# Patient Record
Sex: FEMALE | Race: WHITE | URBAN - METROPOLITAN AREA
[De-identification: names, ages, dates, MRNs, and addresses within clinical notes are randomized per-mention and may not be internally consistent; named-entity substitution may affect disease eponyms.]

---

## 2020-07-21 ENCOUNTER — VIRTUAL VISIT (OUTPATIENT)
Dept: GERIATRICS | Facility: CLINIC | Age: 85
End: 2020-07-21
Payer: MEDICARE

## 2020-07-21 DIAGNOSIS — M15.9 OSTEOARTHRITIS OF MULTIPLE JOINTS, UNSPECIFIED OSTEOARTHRITIS TYPE: ICD-10-CM

## 2020-07-21 DIAGNOSIS — U07.1 ASYMPTOMATIC COVID-19 VIRUS INFECTION: Primary | ICD-10-CM

## 2020-07-21 DIAGNOSIS — R41.89 COGNITIVE IMPAIRMENT: ICD-10-CM

## 2020-07-21 DIAGNOSIS — F60.0 PARANOID PERSONALITY (DISORDER) (H): ICD-10-CM

## 2020-07-21 PROCEDURE — 99309 SBSQ NF CARE MODERATE MDM 30: CPT | Mod: 95 | Performed by: NURSE PRACTITIONER

## 2020-07-21 RX ORDER — MENTHOL 1.4 %
ADHESIVE PATCH, MEDICATED TOPICAL 2 TIMES DAILY
COMMUNITY

## 2020-07-21 RX ORDER — CHOLECALCIFEROL (VITAMIN D3) 50 MCG
2000 TABLET ORAL DAILY
COMMUNITY

## 2020-07-21 ASSESSMENT — MIFFLIN-ST. JEOR: SCORE: 1028.23

## 2020-07-21 NOTE — PROGRESS NOTES
" [facility]  Marian Baird is being evaluated via a billable video visit due to the restrictions of the Covid-19 pandemic and facility request that providers do not come into the building at this time.   The patient has been notified of following:  \"This video visit will be conducted via a call between you and your provider. We have found that certain health care needs can be provided without the need for an in-person physical exam.  This service lets us provide the care you need with a video conversation. If during the course of the call the provider feels a video visit is not appropriate, you will not be charged for this service.\"   The provider has received verbal consent for a Video Visit from the patient and or first contact? Yes  Patient/facility staff would like the video invitation sent by: N/A   Video Start Time: 1:39pm  Which Facility the Patient is at during the time of visit: [facility]    PRIMARY CARE PROVIDER AND CLINIC:  Physician No Ref-Primary, No address on file  Chief Complaint   Patient presents with     Hospital F/U     [place] Medical Record Number:  [id number]  Marian Baird  is a 89 year old  (10/22/1928), admitted to the above facility from [facility]. Admitted to this facility for  medical management and nursing care due to testing positive for COVID and facility unable to manage isolation.    HPI:    HPI information obtained from: facility chart records, facility staff and patient report.     Updates on Status Since Skilled nursing Admission:   Patient asks several times about her COVID test. She does not understand why she is positive or why she is at this facility. She denies SOB, cough, fever, chills, nausea, diarrhea, malaise, myalgia. Appetite is fine. Denies any pain. Staff report some paranoid delusions, but she is generally calm. She is comfort care, on minimal medications.     CODE STATUS/ADVANCE DIRECTIVES DISCUSSION:   DNR / DNI  Patient's " "living condition: lives in a skilled nursing facility  ALLERGIES: Patient has no allergy information on record.  PAST MEDICAL HISTORY:  has no past medical history on file.  PAST SURGICAL HISTORY:   has no past surgical history on file.  FAMILY HISTORY: family history is not on file.  SOCIAL HISTORY:     Current Outpatient Medications   Medication Sig Dispense Refill     Cholecalciferol (VITAMIN D3) 50 MCG (2000 UT) TABS Take 2,000 Units by mouth daily        Menthol-Methyl Salicylate (SHERITA MEI GREASELESS) cream Apply topically 2 times daily          ROS: 10 point ROS of systems including Constitutional, Eyes, Respiratory, Cardiovascular, Gastroenterology, Genitourinary, Integumentary, Musculoskeletal, Psychiatric were all negative except for pertinent positives noted in my HPI.  Vitals:/65   Pulse 66   Temp 97.8  F (36.6  C)   Resp 16   Ht 1.702 m (5' 7\")   Wt 58.1 kg (128 lb)   BMI 20.05 kg/m     Limited Visit Exam done given COVID-19 precautions:  GENERAL APPEARANCE:  Alert, in no distress, thin  ENT:  [place]  EYES:  Conjunctiva and lids normal  RESP:  no respiratory distress, no cough  PSYCH:  insight and judgement impaired, memory impaired     Lab/Diagnostic data:  none    ASSESSMENT/PLAN:  (U07.1) Asymptomatic COVID-19 virus infection  (primary encounter diagnosis)  Comment: Patient could still develop symptoms in the next week or so, but it seems unlikely at this point. She cannot return to her nursing home until she has 2 consecutive negative tests. As long as she remains symptomatic, she can be retested 7 days after her positive test, which would be 7/22  Plan: Retest and discharge as able    (F60.0) Paranoid personality (disorder) (H)  Comment: Not on any medications. No emotional distress noted by staff  Plan: Monitor mood and behavior. Use redirection and emotional support. Avoid psychotropic medication use.    (R41.89) Cognitive impairment  Comment: Difficult to assess fully on video with [place], " but certainly has obvious impairment with her repeated questions  Plan: 24 hour care    (M15.9) Osteoarthritis of multiple joints, unspecified osteoarthritis type  Comment: No c/o pain  Plan: Monitor for pain      Electronically signed by:  OPAL Antony CNP   [facility]  Phone: [phone]      Video-Visit Details  Type of service:  Video Visit  Video End Time (time video stopped): 1:42pm  Distant Location (provider location):  [facility]

## 2020-07-21 NOTE — LETTER
"    7/21/2020        RE: Marian Baird  [address] [facility] [address]         [facility]  Marian Baird is being evaluated via a billable video visit due to the restrictions of the Covid-19 pandemic and facility request that providers do not come into the building at this time.   The patient has been notified of following:  \"This video visit will be conducted via a call between you and your provider. We have found that certain health care needs can be provided without the need for an in-person physical exam.  This service lets us provide the care you need with a video conversation. If during the course of the call the provider feels a video visit is not appropriate, you will not be charged for this service.\"   The provider has received verbal consent for a Video Visit from the patient and or first contact? Yes  Patient/facility staff would like the video invitation sent by: N/A   Video Start Time: 1:39pm  Which Facility the Patient is at during the time of visit: [facility]    PRIMARY CARE PROVIDER AND CLINIC:  Physician No Ref-Primary, No address on file  Chief Complaint   Patient presents with     Hospital F/U     [place] Medical Record Number:  [id number]  Marian Baird  is a 89 year old  (10/22/1928), admitted to the above facility from [facility]. Admitted to this facility for  medical management and nursing care due to testing positive for COVID and facility unable to manage isolation.    HPI:    HPI information obtained from: facility chart records, facility staff and patient report.     Updates on Status Since Skilled nursing Admission:   Patient asks several times about her COVID test. She does not understand why she is positive or why she is at this facility. She denies SOB, cough, fever, chills, nausea, diarrhea, malaise, myalgia. Appetite is fine. Denies any pain. Staff report some paranoid delusions, but she is generally calm. She is comfort care, " "on minimal medications.     CODE STATUS/ADVANCE DIRECTIVES DISCUSSION:   DNR / DNI  Patient's living condition: lives in a skilled nursing facility  ALLERGIES: Patient has no allergy information on record.  PAST MEDICAL HISTORY:  has no past medical history on file.  PAST SURGICAL HISTORY:   has no past surgical history on file.  FAMILY HISTORY: family history is not on file.  SOCIAL HISTORY:     Current Outpatient Medications   Medication Sig Dispense Refill     Cholecalciferol (VITAMIN D3) 50 MCG (2000 UT) TABS Take 2,000 Units by mouth daily        Menthol-Methyl Salicylate (SHERITA MEI GREASELESS) cream Apply topically 2 times daily          ROS: 10 point ROS of systems including Constitutional, Eyes, Respiratory, Cardiovascular, Gastroenterology, Genitourinary, Integumentary, Musculoskeletal, Psychiatric were all negative except for pertinent positives noted in my HPI.  Vitals:/65   Pulse 66   Temp 97.8  F (36.6  C)   Resp 16   Ht 1.702 m (5' 7\")   Wt 58.1 kg (128 lb)   BMI 20.05 kg/m     Limited Visit Exam done given COVID-19 precautions:  GENERAL APPEARANCE:  Alert, in no distress, thin  ENT:  [place]  EYES:  Conjunctiva and lids normal  RESP:  no respiratory distress, no cough  PSYCH:  insight and judgement impaired, memory impaired     Lab/Diagnostic data:  none    ASSESSMENT/PLAN:  (U07.1) Asymptomatic COVID-19 virus infection  (primary encounter diagnosis)  Comment: Patient could still develop symptoms in the next week or so, but it seems unlikely at this point. She cannot return to her nursing home until she has 2 consecutive negative tests. As long as she remains symptomatic, she can be retested 7 days after her positive test, which would be 7/22  Plan: Retest and discharge as able    (F60.0) Paranoid personality (disorder) (H)  Comment: Not on any medications. No emotional distress noted by staff  Plan: Monitor mood and behavior. Use redirection and emotional support. Avoid psychotropic medication " use.    (R41.89) Cognitive impairment  Comment: Difficult to assess fully on video with [place], but certainly has obvious impairment with her repeated questions  Plan: 24 hour care    (M15.9) Osteoarthritis of multiple joints, unspecified osteoarthritis type  Comment: No c/o pain  Plan: Monitor for pain      Electronically signed by:  OPAL Antony CNP   [facility]  Phone: [phone]      Video-Visit Details  Type of service:  Video Visit  Video End Time (time video stopped): 1:42pm  Distant Location (provider location):  [facility]

## 2020-07-22 VITALS
RESPIRATION RATE: 16 BRPM | TEMPERATURE: 97.8 F | SYSTOLIC BLOOD PRESSURE: 101 MMHG | BODY MASS INDEX: 20.09 KG/M2 | HEART RATE: 66 BPM | HEIGHT: 67 IN | DIASTOLIC BLOOD PRESSURE: 65 MMHG | WEIGHT: 128 LBS

## 2020-07-22 PROBLEM — S22.080A COMPRESSION FRACTURE OF T12 VERTEBRA (H): Status: ACTIVE | Noted: 2019-09-03

## 2020-07-22 PROBLEM — F60.0 PARANOID PERSONALITY (DISORDER) (H): Status: ACTIVE | Noted: 2019-09-03

## 2020-07-22 PROBLEM — R41.89 COGNITIVE IMPAIRMENT: Status: ACTIVE | Noted: 2019-09-05

## 2020-07-22 PROBLEM — D63.8 ANEMIA OF CHRONIC DISEASE: Status: ACTIVE | Noted: 2018-03-15

## 2020-07-22 PROBLEM — S32.010A CLOSED COMPRESSION FRACTURE OF L1 VERTEBRA (H): Status: ACTIVE | Noted: 2019-09-03

## 2020-07-22 PROBLEM — S09.90XA CLOSED HEAD INJURY: Status: ACTIVE | Noted: 2018-03-15

## 2020-07-22 PROBLEM — I82.402 DEEP VEIN THROMBOSIS (DVT) OF LEFT LOWER EXTREMITY (H): Status: ACTIVE | Noted: 2019-09-03

## 2020-07-22 PROBLEM — S72.002A: Status: ACTIVE | Noted: 2017-06-19

## 2020-07-22 PROBLEM — R51.9 CHRONIC DAILY HEADACHE: Status: ACTIVE | Noted: 2017-06-19

## 2020-07-22 PROBLEM — S32.592A FRACTURE OF MULTIPLE PUBIC RAMI, LEFT, CLOSED, INITIAL ENCOUNTER (H): Status: ACTIVE | Noted: 2018-02-14

## 2020-07-22 PROBLEM — M15.9 OSTEOARTHRITIS OF MULTIPLE JOINTS: Status: ACTIVE | Noted: 2018-02-14

## 2020-07-28 ENCOUNTER — VIRTUAL VISIT (OUTPATIENT)
Dept: GERIATRICS | Facility: CLINIC | Age: 85
End: 2020-07-28
Payer: MEDICARE

## 2020-07-28 VITALS
BODY MASS INDEX: 20.01 KG/M2 | SYSTOLIC BLOOD PRESSURE: 136 MMHG | WEIGHT: 127.5 LBS | OXYGEN SATURATION: 98 % | TEMPERATURE: 97.8 F | HEART RATE: 77 BPM | RESPIRATION RATE: 18 BRPM | DIASTOLIC BLOOD PRESSURE: 84 MMHG | HEIGHT: 67 IN

## 2020-07-28 DIAGNOSIS — F60.0 PARANOID PERSONALITY (DISORDER) (H): ICD-10-CM

## 2020-07-28 DIAGNOSIS — M15.9 OSTEOARTHRITIS OF MULTIPLE JOINTS, UNSPECIFIED OSTEOARTHRITIS TYPE: ICD-10-CM

## 2020-07-28 DIAGNOSIS — U07.1 ASYMPTOMATIC COVID-19 VIRUS INFECTION: Primary | ICD-10-CM

## 2020-07-28 DIAGNOSIS — R41.89 COGNITIVE IMPAIRMENT: ICD-10-CM

## 2020-07-28 PROCEDURE — 99315 NF DSCHRG MGMT 30 MIN/LESS: CPT | Mod: GT | Performed by: NURSE PRACTITIONER

## 2020-07-28 ASSESSMENT — MIFFLIN-ST. JEOR: SCORE: 1025.97

## 2020-07-28 NOTE — PROGRESS NOTES
"[facility] DISCHARGE SUMMARY  Marian Baird is being evaluated via a billable video visit due to the restrictions of the Covid-19 pandemic and facility request that providers do not come into the building at this time.   The patient has been notified of following:  \"This video visit will be conducted via a call between you and a [facility] provider. We have found that certain health care needs can be provided without the need for an in-person physical exam.  This service lets us provide the care you need with a video conversation. If during the course of the call the provider feels a video visit is not appropriate, you will not be charged for this service.\"   The provider has received verbal consent for a Video Visit from the patient or family/first contact? Yes  Patient or /facility staff would like the video invitation sent by: N/A   Video Start Time: 10:49am    PATIENT'S NAME: Marian Baird  YOB: 1928  MEDICAL RECORD NUMBER:  [id number]  Place of Location at the time of visit: [facility]  PRIMARY CARE PROVIDER AND CLINIC RESPONSIBLE AFTER TRANSFER:   [facility] [address] [place] [address] [facility];  [facility] Provider   Transferring providers: OPAL Antony CNP, Darrell Cohen MD  Recent Hospitalization/ED:  none, admitted from [facility].  Date of SNF Admission: July / 17 / 2020  Date of SNF (anticipated) Discharge: July / 29 / 2020  Discharged to: previous SNF    CODE STATUS/ADVANCE DIRECTIVES DISCUSSION:  DNR / DNI   ALLERGIES: Patient has no known allergies.    DISCHARGE DIAGNOSIS/NURSING FACILITY COURSE:   Admitted to this facility for  medical management and nursing care due to testing positive for COVID and facility unable to manage isolation.    Asymptomatic COVID-19 virus infection  Patient tested positive on 7/15/20. She was asymptomatic throughout her stay. [facility] guidelines now say that patients can be removed from " "isolation 10 days after a positive test if they are asymptomatic.    Paranoid personality (disorder) (H)  Cognitive impairment  Patient did not understand why she was moved. She asked about maybe staying here because she didn't like how that other nursing home just pulled her out of there suddenly. She asked the same questions throughout the visit, mostly about staying and whether this was a nursing home. She likely will settle back into [facility] without issue.    Osteoarthritis of multiple joints, unspecified osteoarthritis type  No c/o pain      Past Medical History:  has no past medical history on file.  Discharge Medications:    Current Outpatient Medications   Medication Sig Dispense Refill     Cholecalciferol (VITAMIN D3) 50 MCG (2000 UT) TABS Take 2,000 Units by mouth daily        Menthol-Methyl Salicylate (SHERITA MEI GREASELESS) cream Apply topically 2 times daily        Medication Changes/Rationale:     none  Controlled medications sent with patient:   not applicable/none     ROS: Limited secondary to cognitive impairment but today pt reports 10 point ROS of systems including Constitutional, Eyes, Respiratory, Cardiovascular, Gastroenterology, Genitourinary, Integumentary, Musculoskeletal, Psychiatric were all negative except for pertinent positives noted in my HPI.    Physical Exam: Vitals: /84   Pulse 77   Temp 97.8  F (36.6  C)   Resp 18   Ht 1.702 m (5' 7\")   Wt 57.8 kg (127 lb 8 oz)   SpO2 98%   BMI 19.97 kg/m   BMI= Body mass index is 19.97 kg/m .  Limited Visit exam done for COVID-19 precautions  GENERAL APPEARANCE:  Alert, in no distress  EYES:  Conjunctiva and lids normal  RESP:  no respiratory distress  PSYCH:  insight and judgement impaired, memory impaired , affect and mood normal     SNF labs: none    DISCHARGE PLAN:    Follow up labs: No labs orders/due    Medical Follow Up:      Follow up with primary care provider in 2-4 weeks    TOTAL DISCHARGE TIME:   Less than or " equal to 30 minutes  Electronically signed by:  OPAL Antony CNP   [facility]  Phone: [phone]      Video-Visit Details  Type of service:  Video Visit  Video End Time (time video stopped): 10:53am  Distant Location (provider location):  [facility]

## 2020-07-28 NOTE — LETTER
"    7/28/2020        RE: Marian Baird  [address] [facility] [address]        [facility] DISCHARGE SUMMARY  Marian Baird is being evaluated via a billable video visit due to the restrictions of the Covid-19 pandemic and facility request that providers do not come into the building at this time.   The patient has been notified of following:  \"This video visit will be conducted via a call between you and a [facility] provider. We have found that certain health care needs can be provided without the need for an in-person physical exam.  This service lets us provide the care you need with a video conversation. If during the course of the call the provider feels a video visit is not appropriate, you will not be charged for this service.\"   The provider has received verbal consent for a Video Visit from the patient or family/first contact? Yes  Patient or /facility staff would like the video invitation sent by: N/A   Video Start Time: 10:49am    PATIENT'S NAME: Marian Baird  YOB: 1928  MEDICAL RECORD NUMBER:  [id number]  Place of Location at the time of visit: [facility]  PRIMARY CARE PROVIDER AND CLINIC RESPONSIBLE AFTER TRANSFER:   [facility] [address] [facility];  Non-[place] Provider   Transferring providers: OPAL Antony CNP, Darrell Cohen MD  Recent Hospitalization/ED:  none, admitted from [facility].  Date of SNF Admission: July / 17 / 2020  Date of SNF (anticipated) Discharge: July / 29 / 2020  Discharged to: previous SNF    CODE STATUS/ADVANCE DIRECTIVES DISCUSSION:  DNR / DNI   ALLERGIES: Patient has no known allergies.    DISCHARGE DIAGNOSIS/NURSING FACILITY COURSE:   Admitted to this facility for  medical management and nursing care due to testing positive for COVID and facility unable to manage isolation.    Asymptomatic COVID-19 virus infection  Patient tested positive on 7/15/20. She was " "asymptomatic throughout her stay. [facility] guidelines now say that patients can be removed from isolation 10 days after a positive test if they are asymptomatic.    Paranoid personality (disorder) (H)  Cognitive impairment  Patient did not understand why she was moved. She asked about maybe staying here because she didn't like how that other nursing home just pulled her out of there suddenly. She asked the same questions throughout the visit, mostly about staying and whether this was a nursing home. She likely will settle back into [facility] without issue.    Osteoarthritis of multiple joints, unspecified osteoarthritis type  No c/o pain      Past Medical History:  has no past medical history on file.  Discharge Medications:    Current Outpatient Medications   Medication Sig Dispense Refill     Cholecalciferol (VITAMIN D3) 50 MCG (2000 UT) TABS Take 2,000 Units by mouth daily        Menthol-Methyl Salicylate (SHERITA MEI GREASELESS) cream Apply topically 2 times daily        Medication Changes/Rationale:     none  Controlled medications sent with patient:   not applicable/none     ROS: Limited secondary to cognitive impairment but today pt reports 10 point ROS of systems including Constitutional, Eyes, Respiratory, Cardiovascular, Gastroenterology, Genitourinary, Integumentary, Musculoskeletal, Psychiatric were all negative except for pertinent positives noted in my HPI.    Physical Exam: Vitals: /84   Pulse 77   Temp 97.8  F (36.6  C)   Resp 18   Ht 1.702 m (5' 7\")   Wt 57.8 kg (127 lb 8 oz)   SpO2 98%   BMI 19.97 kg/m   BMI= Body mass index is 19.97 kg/m .  Limited Visit exam done for COVID-19 precautions  GENERAL APPEARANCE:  Alert, in no distress  EYES:  Conjunctiva and lids normal  RESP:  no respiratory distress  PSYCH:  insight and judgement impaired, memory impaired , affect and mood normal     SNF labs: none    DISCHARGE PLAN:    Follow up labs: No labs orders/due    Medical Follow Up:   "    Follow up with primary care provider in 2-4 weeks    TOTAL DISCHARGE TIME:   Less than or equal to 30 minutes  Electronically signed by:  OPAL Antony CNP   [facility]  Phone: [phone]      Video-Visit Details  Type of service:  Video Visit  Video End Time (time video stopped): 10:53am  Distant Location (provider location):  [facility]